# Patient Record
Sex: FEMALE | Race: OTHER | Employment: FULL TIME | URBAN - METROPOLITAN AREA
[De-identification: names, ages, dates, MRNs, and addresses within clinical notes are randomized per-mention and may not be internally consistent; named-entity substitution may affect disease eponyms.]

---

## 2018-10-29 LAB
ANTIBODY SCREEN, EXTERNAL: NEGATIVE
CHLAMYDIA, EXTERNAL: NEGATIVE
HBSAG, EXTERNAL: NEGATIVE
HEPATITIS C AB,   EXT: NEGATIVE
HIV, EXTERNAL: NEGATIVE
N. GONORRHEA, EXTERNAL: NEGATIVE
RPR, EXTERNAL: NEGATIVE
RUBELLA, EXTERNAL: NORMAL
TYPE, ABO & RH, EXTERNAL: NORMAL

## 2019-03-29 LAB — GRBS, EXTERNAL: POSITIVE

## 2019-04-29 ENCOUNTER — ANESTHESIA EVENT (OUTPATIENT)
Dept: LABOR AND DELIVERY | Age: 29
End: 2019-04-29
Payer: OTHER GOVERNMENT

## 2019-04-29 ENCOUNTER — ANESTHESIA (OUTPATIENT)
Dept: LABOR AND DELIVERY | Age: 29
End: 2019-04-29
Payer: OTHER GOVERNMENT

## 2019-04-29 ENCOUNTER — HOSPITAL ENCOUNTER (INPATIENT)
Age: 29
LOS: 2 days | Discharge: HOME | End: 2019-05-01
Attending: OBSTETRICS & GYNECOLOGY | Admitting: OBSTETRICS & GYNECOLOGY
Payer: OTHER GOVERNMENT

## 2019-04-29 PROBLEM — O34.219 HISTORY OF CESAREAN DELIVERY AFFECTING PREGNANCY: Status: ACTIVE | Noted: 2019-04-29

## 2019-04-29 LAB
ABO + RH BLD: NORMAL
BASOPHILS # BLD: 0 K/UL (ref 0–0.1)
BASOPHILS NFR BLD: 0 % (ref 0–2)
BLOOD GROUP ANTIBODIES SERPL: NORMAL
DIFFERENTIAL METHOD BLD: ABNORMAL
EOSINOPHIL # BLD: 0 K/UL (ref 0–0.4)
EOSINOPHIL NFR BLD: 1 % (ref 0–5)
ERYTHROCYTE [DISTWIDTH] IN BLOOD BY AUTOMATED COUNT: 13.7 % (ref 11.6–14.5)
HCT VFR BLD AUTO: 33.1 % (ref 35–45)
HGB BLD-MCNC: 11.3 G/DL (ref 12–16)
LYMPHOCYTES # BLD: 1 K/UL (ref 0.9–3.6)
LYMPHOCYTES NFR BLD: 16 % (ref 21–52)
MCH RBC QN AUTO: 32.1 PG (ref 24–34)
MCHC RBC AUTO-ENTMCNC: 34.1 G/DL (ref 31–37)
MCV RBC AUTO: 94 FL (ref 74–97)
MONOCYTES # BLD: 0.4 K/UL (ref 0.05–1.2)
MONOCYTES NFR BLD: 6 % (ref 3–10)
NEUTS SEG # BLD: 4.8 K/UL (ref 1.8–8)
NEUTS SEG NFR BLD: 77 % (ref 40–73)
PLATELET # BLD AUTO: 170 K/UL (ref 135–420)
PMV BLD AUTO: 12 FL (ref 9.2–11.8)
RBC # BLD AUTO: 3.52 M/UL (ref 4.2–5.3)
SPECIMEN EXP DATE BLD: NORMAL
WBC # BLD AUTO: 6.2 K/UL (ref 4.6–13.2)

## 2019-04-29 PROCEDURE — 75410000003 HC RECOV DEL/VAG/CSECN EA 0.5 HR: Performed by: OBSTETRICS & GYNECOLOGY

## 2019-04-29 PROCEDURE — 74011250636 HC RX REV CODE- 250/636

## 2019-04-29 PROCEDURE — 74011250637 HC RX REV CODE- 250/637: Performed by: NURSE ANESTHETIST, CERTIFIED REGISTERED

## 2019-04-29 PROCEDURE — 65270000029 HC RM PRIVATE

## 2019-04-29 PROCEDURE — 76060000078 HC EPIDURAL ANESTHESIA: Performed by: OBSTETRICS & GYNECOLOGY

## 2019-04-29 PROCEDURE — 74011250636 HC RX REV CODE- 250/636: Performed by: NURSE ANESTHETIST, CERTIFIED REGISTERED

## 2019-04-29 PROCEDURE — 75410000003 HC RECOV DEL/VAG/CSECN EA 0.5 HR

## 2019-04-29 PROCEDURE — 77030002933 HC SUT MCRYL J&J -A: Performed by: OBSTETRICS & GYNECOLOGY

## 2019-04-29 PROCEDURE — 77030039266 HC ADH SKN EXOFIN S2SG -A: Performed by: OBSTETRICS & GYNECOLOGY

## 2019-04-29 PROCEDURE — 10907ZC DRAINAGE OF AMNIOTIC FLUID, THERAPEUTIC FROM PRODUCTS OF CONCEPTION, VIA NATURAL OR ARTIFICIAL OPENING: ICD-10-PCS | Performed by: OBSTETRICS & GYNECOLOGY

## 2019-04-29 PROCEDURE — 74011250636 HC RX REV CODE- 250/636: Performed by: OBSTETRICS & GYNECOLOGY

## 2019-04-29 PROCEDURE — 77030002974 HC SUT PLN J&J -A: Performed by: OBSTETRICS & GYNECOLOGY

## 2019-04-29 PROCEDURE — 77030007866 HC KT SPN ANES BBMI -B: Performed by: ANESTHESIOLOGY

## 2019-04-29 PROCEDURE — 76010000391 HC C SECN FIRST 1 HR: Performed by: OBSTETRICS & GYNECOLOGY

## 2019-04-29 PROCEDURE — 77030031139 HC SUT VCRL2 J&J -A: Performed by: OBSTETRICS & GYNECOLOGY

## 2019-04-29 PROCEDURE — 74011000250 HC RX REV CODE- 250

## 2019-04-29 PROCEDURE — 86900 BLOOD TYPING SEROLOGIC ABO: CPT

## 2019-04-29 PROCEDURE — 77030032490 HC SLV COMPR SCD KNE COVD -B: Performed by: OBSTETRICS & GYNECOLOGY

## 2019-04-29 PROCEDURE — 77030018842 HC SOL IRR SOD CL 9% BAXT -A: Performed by: OBSTETRICS & GYNECOLOGY

## 2019-04-29 PROCEDURE — 85025 COMPLETE CBC W/AUTO DIFF WBC: CPT

## 2019-04-29 PROCEDURE — 77010026065 HC OXYGEN MINIMUM MEDICAL AIR

## 2019-04-29 RX ORDER — HYDROMORPHONE HYDROCHLORIDE 1 MG/ML
1 INJECTION, SOLUTION INTRAMUSCULAR; INTRAVENOUS; SUBCUTANEOUS
Status: ACTIVE | OUTPATIENT
Start: 2019-04-29 | End: 2019-04-30

## 2019-04-29 RX ORDER — ACETAMINOPHEN 325 MG/1
650 TABLET ORAL
Status: DISCONTINUED | OUTPATIENT
Start: 2019-04-29 | End: 2019-05-01 | Stop reason: HOSPADM

## 2019-04-29 RX ORDER — SODIUM CHLORIDE 0.9 % (FLUSH) 0.9 %
5-40 SYRINGE (ML) INJECTION AS NEEDED
Status: DISCONTINUED | OUTPATIENT
Start: 2019-04-29 | End: 2019-05-01 | Stop reason: HOSPADM

## 2019-04-29 RX ORDER — SODIUM CHLORIDE 0.9 % (FLUSH) 0.9 %
5-40 SYRINGE (ML) INJECTION EVERY 8 HOURS
Status: DISCONTINUED | OUTPATIENT
Start: 2019-04-29 | End: 2019-05-01 | Stop reason: HOSPADM

## 2019-04-29 RX ORDER — OXYCODONE HYDROCHLORIDE 5 MG/1
5 TABLET ORAL
Status: DISCONTINUED | OUTPATIENT
Start: 2019-04-29 | End: 2019-04-29 | Stop reason: SDUPTHER

## 2019-04-29 RX ORDER — SIMETHICONE 80 MG
80 TABLET,CHEWABLE ORAL
Status: DISCONTINUED | OUTPATIENT
Start: 2019-04-29 | End: 2019-05-01 | Stop reason: HOSPADM

## 2019-04-29 RX ORDER — TRISODIUM CITRATE DIHYDRATE AND CITRIC ACID MONOHYDRATE 500; 334 MG/5ML; MG/5ML
30 SOLUTION ORAL ONCE
Status: COMPLETED | OUTPATIENT
Start: 2019-04-29 | End: 2019-04-29

## 2019-04-29 RX ORDER — EPHEDRINE SULFATE 50 MG/ML
INJECTION, SOLUTION INTRAVENOUS AS NEEDED
Status: DISCONTINUED | OUTPATIENT
Start: 2019-04-29 | End: 2019-04-29 | Stop reason: HOSPADM

## 2019-04-29 RX ORDER — OXYCODONE HYDROCHLORIDE 5 MG/1
10 TABLET ORAL
Status: DISCONTINUED | OUTPATIENT
Start: 2019-04-29 | End: 2019-04-29 | Stop reason: SDUPTHER

## 2019-04-29 RX ORDER — BUPIVACAINE HYDROCHLORIDE 7.5 MG/ML
INJECTION, SOLUTION INTRASPINAL AS NEEDED
Status: DISCONTINUED | OUTPATIENT
Start: 2019-04-29 | End: 2019-04-29 | Stop reason: HOSPADM

## 2019-04-29 RX ORDER — MORPHINE SULFATE 1 MG/ML
INJECTION, SOLUTION EPIDURAL; INTRATHECAL; INTRAVENOUS AS NEEDED
Status: DISCONTINUED | OUTPATIENT
Start: 2019-04-29 | End: 2019-04-29 | Stop reason: HOSPADM

## 2019-04-29 RX ORDER — DOCUSATE SODIUM 100 MG/1
100 CAPSULE, LIQUID FILLED ORAL 2 TIMES DAILY
Status: DISCONTINUED | OUTPATIENT
Start: 2019-04-29 | End: 2019-05-01 | Stop reason: HOSPADM

## 2019-04-29 RX ORDER — OXYCODONE AND ACETAMINOPHEN 5; 325 MG/1; MG/1
1-2 TABLET ORAL
Status: DISCONTINUED | OUTPATIENT
Start: 2019-04-29 | End: 2019-05-01 | Stop reason: HOSPADM

## 2019-04-29 RX ORDER — ONDANSETRON 2 MG/ML
INJECTION INTRAMUSCULAR; INTRAVENOUS AS NEEDED
Status: DISCONTINUED | OUTPATIENT
Start: 2019-04-29 | End: 2019-04-29 | Stop reason: HOSPADM

## 2019-04-29 RX ORDER — PROMETHAZINE HYDROCHLORIDE 25 MG/ML
25 INJECTION, SOLUTION INTRAMUSCULAR; INTRAVENOUS
Status: DISCONTINUED | OUTPATIENT
Start: 2019-04-29 | End: 2019-05-01 | Stop reason: HOSPADM

## 2019-04-29 RX ORDER — FACIAL-BODY WIPES
10 EACH TOPICAL
Status: DISCONTINUED | OUTPATIENT
Start: 2019-04-29 | End: 2019-05-01 | Stop reason: HOSPADM

## 2019-04-29 RX ORDER — HYDROCORTISONE 25 MG/G
CREAM TOPICAL AS NEEDED
Status: DISCONTINUED | OUTPATIENT
Start: 2019-04-29 | End: 2019-05-01 | Stop reason: HOSPADM

## 2019-04-29 RX ORDER — NALOXONE HYDROCHLORIDE 0.4 MG/ML
0.4 INJECTION, SOLUTION INTRAMUSCULAR; INTRAVENOUS; SUBCUTANEOUS
Status: ACTIVE | OUTPATIENT
Start: 2019-04-29 | End: 2019-04-30

## 2019-04-29 RX ORDER — ACETAMINOPHEN 325 MG/1
650 TABLET ORAL
Status: DISCONTINUED | OUTPATIENT
Start: 2019-04-29 | End: 2019-04-29 | Stop reason: SDUPTHER

## 2019-04-29 RX ORDER — CEFAZOLIN SODIUM 2 G/50ML
2 SOLUTION INTRAVENOUS ONCE
Status: COMPLETED | OUTPATIENT
Start: 2019-04-29 | End: 2019-04-29

## 2019-04-29 RX ORDER — ONDANSETRON 2 MG/ML
4 INJECTION INTRAMUSCULAR; INTRAVENOUS
Status: DISCONTINUED | OUTPATIENT
Start: 2019-04-29 | End: 2019-04-29 | Stop reason: SDUPTHER

## 2019-04-29 RX ORDER — DIPHENHYDRAMINE HYDROCHLORIDE 50 MG/ML
25 INJECTION, SOLUTION INTRAMUSCULAR; INTRAVENOUS
Status: DISCONTINUED | OUTPATIENT
Start: 2019-04-29 | End: 2019-05-01 | Stop reason: HOSPADM

## 2019-04-29 RX ORDER — SODIUM CHLORIDE, SODIUM LACTATE, POTASSIUM CHLORIDE, CALCIUM CHLORIDE 600; 310; 30; 20 MG/100ML; MG/100ML; MG/100ML; MG/100ML
125 INJECTION, SOLUTION INTRAVENOUS CONTINUOUS
Status: DISPENSED | OUTPATIENT
Start: 2019-04-29 | End: 2019-04-30

## 2019-04-29 RX ORDER — IBUPROFEN 400 MG/1
800 TABLET ORAL
Status: DISCONTINUED | OUTPATIENT
Start: 2019-05-02 | End: 2019-04-30

## 2019-04-29 RX ORDER — ONDANSETRON 2 MG/ML
4 INJECTION INTRAMUSCULAR; INTRAVENOUS
Status: DISCONTINUED | OUTPATIENT
Start: 2019-04-29 | End: 2019-05-01 | Stop reason: HOSPADM

## 2019-04-29 RX ORDER — KETOROLAC TROMETHAMINE 30 MG/ML
30 INJECTION, SOLUTION INTRAMUSCULAR; INTRAVENOUS EVERY 6 HOURS
Status: DISCONTINUED | OUTPATIENT
Start: 2019-04-29 | End: 2019-04-30 | Stop reason: ALTCHOICE

## 2019-04-29 RX ORDER — OXYTOCIN/RINGER'S LACTATE 20/1000 ML
125 PLASTIC BAG, INJECTION (ML) INTRAVENOUS CONTINUOUS
Status: DISCONTINUED | OUTPATIENT
Start: 2019-04-29 | End: 2019-05-01 | Stop reason: HOSPADM

## 2019-04-29 RX ORDER — OXYTOCIN/RINGER'S LACTATE 20/1000 ML
PLASTIC BAG, INJECTION (ML) INTRAVENOUS AS NEEDED
Status: DISCONTINUED | OUTPATIENT
Start: 2019-04-29 | End: 2019-04-29

## 2019-04-29 RX ORDER — KETOROLAC TROMETHAMINE 30 MG/ML
INJECTION, SOLUTION INTRAMUSCULAR; INTRAVENOUS AS NEEDED
Status: DISCONTINUED | OUTPATIENT
Start: 2019-04-29 | End: 2019-04-29 | Stop reason: HOSPADM

## 2019-04-29 RX ORDER — KETOROLAC TROMETHAMINE 30 MG/ML
30 INJECTION, SOLUTION INTRAMUSCULAR; INTRAVENOUS EVERY 6 HOURS
Status: DISCONTINUED | OUTPATIENT
Start: 2019-04-29 | End: 2019-04-29 | Stop reason: SDUPTHER

## 2019-04-29 RX ORDER — SODIUM CHLORIDE, SODIUM LACTATE, POTASSIUM CHLORIDE, CALCIUM CHLORIDE 600; 310; 30; 20 MG/100ML; MG/100ML; MG/100ML; MG/100ML
125 INJECTION, SOLUTION INTRAVENOUS CONTINUOUS
Status: DISCONTINUED | OUTPATIENT
Start: 2019-04-29 | End: 2019-05-01

## 2019-04-29 RX ADMIN — SODIUM CHLORIDE, SODIUM LACTATE, POTASSIUM CHLORIDE, AND CALCIUM CHLORIDE: 600; 310; 30; 20 INJECTION, SOLUTION INTRAVENOUS at 10:48

## 2019-04-29 RX ADMIN — SODIUM CHLORIDE, SODIUM LACTATE, POTASSIUM CHLORIDE, AND CALCIUM CHLORIDE 125 ML/HR: 600; 310; 30; 20 INJECTION, SOLUTION INTRAVENOUS at 09:16

## 2019-04-29 RX ADMIN — KETOROLAC TROMETHAMINE 30 MG: 30 INJECTION, SOLUTION INTRAMUSCULAR at 18:13

## 2019-04-29 RX ADMIN — SODIUM CHLORIDE, SODIUM LACTATE, POTASSIUM CHLORIDE, AND CALCIUM CHLORIDE 1000 ML: 600; 310; 30; 20 INJECTION, SOLUTION INTRAVENOUS at 08:45

## 2019-04-29 RX ADMIN — BUPIVACAINE HYDROCHLORIDE 1.4 ML: 7.5 INJECTION, SOLUTION INTRASPINAL at 10:33

## 2019-04-29 RX ADMIN — EPHEDRINE SULFATE 20 MG: 50 INJECTION, SOLUTION INTRAVENOUS at 10:37

## 2019-04-29 RX ADMIN — KETOROLAC TROMETHAMINE 30 MG: 30 INJECTION, SOLUTION INTRAMUSCULAR; INTRAVENOUS at 10:53

## 2019-04-29 RX ADMIN — SODIUM CITRATE AND CITRIC ACID MONOHYDRATE 30 ML: 500; 334 SOLUTION ORAL at 10:19

## 2019-04-29 RX ADMIN — Medication 125 ML/HR: at 11:34

## 2019-04-29 RX ADMIN — PROMETHAZINE HYDROCHLORIDE 25 MG: 25 INJECTION INTRAMUSCULAR; INTRAVENOUS at 11:46

## 2019-04-29 RX ADMIN — MORPHINE SULFATE 4.75 MG: 1 INJECTION, SOLUTION EPIDURAL; INTRATHECAL; INTRAVENOUS at 11:03

## 2019-04-29 RX ADMIN — MORPHINE SULFATE 5 MG: 1 INJECTION, SOLUTION EPIDURAL; INTRATHECAL; INTRAVENOUS at 11:00

## 2019-04-29 RX ADMIN — CEFAZOLIN SODIUM 2 G: 2 SOLUTION INTRAVENOUS at 10:25

## 2019-04-29 RX ADMIN — MORPHINE SULFATE 0.25 MG: 1 INJECTION, SOLUTION EPIDURAL; INTRATHECAL; INTRAVENOUS at 10:33

## 2019-04-29 RX ADMIN — SODIUM CHLORIDE, SODIUM LACTATE, POTASSIUM CHLORIDE, AND CALCIUM CHLORIDE 125 ML/HR: 600; 310; 30; 20 INJECTION, SOLUTION INTRAVENOUS at 19:11

## 2019-04-29 RX ADMIN — ONDANSETRON 8 MG: 2 INJECTION INTRAMUSCULAR; INTRAVENOUS at 10:25

## 2019-04-29 NOTE — L&D DELIVERY NOTE
Delivery Summary    Patient: Ginette San MRN: [id number]  SSN: [id number]    YOB: 1990  Age: 30 y.o. Sex: female        Information for the patient's :  Eva Charles [[id number]]       Labor Events:    Labor: No    Steroids: None   Cervical Ripening Date/Time:       Cervical Ripening Type: None   Antibiotics During Labor: No   Rupture Identifier: Sac 1    Rupture Date/Time: 2019 10:47 AM   Rupture Type: AROM   Amniotic Fluid Volume: Moderate    Amniotic Fluid Description: Clear    Amniotic Fluid Odor: None    Induction: None       Induction Date/Time:        Indications for Induction:      Augmentation: None   Augmentation Date/Time:      Indications for Augmentation:     Labor complications: None       Additional complications:        Delivery Events:  Indications For Episiotomy:     Episiotomy: None   Perineal Laceration(s): None   Repaired:     Periurethral Laceration Location:      Repaired:     Labial Laceration Location:     Repaired:     Sulcal Laceration Location:     Repaired:     Vaginal Laceration Location:     Repaired:     Cervical Laceration Location:     Repaired:     Repair Suture:     Number of Repair Packets:     Estimated Blood Loss (ml):  ml     Delivery Date: 2019    Delivery Time: 10:48 AM   Delivery Type: , Low Transverse     Details    Trial of Labor: No   Primary/Repeat: Primary   Priority: Routine   Indications:   Other (Add Comments) patient request     Sex:  Female     Gestational Age: 44w3d  Delivery Clinician:  Ceci Martinez  Living Status: Living   Delivery Location: OR            APGARS  One minute Five minutes Ten minutes   Skin color: 1   1        Heart rate: 2   2        Grimace: 2           Muscle tone: 2   2        Breathin   2        Totals: 9             Presentation: Vertex    Position:        Resuscitation Method:  Tactile Stimulation     Meconium Stained: None      Cord Information: 3 Vessels  Complications: None  Cord around:    Delayed cord clamping? No  Cord clamped date/time:2019 10:48 AM  Disposition of Cord Blood: Lab    Blood Gases Sent?: No    Placenta:  Date/Time: 2019 10:49 AM  Removal: Expressed      Appearance: Normal     [place] Measurements:  Birth Weight:        Birth Length:        Head Circumference:        Chest Circumference:       Abdominal Girth: Other Providers:   KLAUS Stokes Nelida Alstrom, [facility], DAVID Verlin Mages, Obstetrician;Primary Nurse;Primary  Nurse;Pediatrician; Anesthesiologist;Crna             Group B Strep:   Lab Results   Component Value Date/Time    GrBStrep, External Positive 2019     Information for the patient's :  Sabrina Charles [[id number]]   No results found for: ABORH, PCTABR, PCTDIG, BILI, ABORHEXT, ABORH

## 2019-04-29 NOTE — PROGRESS NOTES
arrived ambulatory for scheduled  scetion. She is accompanied by her significant other Tomasa Bautista. Pt to bathroom changed to a gown, monitors applied. 2 unsuccessful Iv attempts per [facility] RN, successful IV on 3rd attempt in L upper forearm. Consents signed Hair clipped and CHG wipes done per [facility] RN.

## 2019-04-29 NOTE — ANESTHESIA PREPROCEDURE EVALUATION
Relevant Problems No relevant active problems Anesthetic History No history of anesthetic complications Review of Systems / Medical History Patient summary reviewed and pertinent labs reviewed Pulmonary Within defined limits Neuro/Psych Within defined limits Cardiovascular Within defined limits Exercise tolerance: >4 METS 
  
GI/Hepatic/Renal 
Within defined limits Endo/Other Within defined limits Other Findings Physical Exam 
 
Airway Mallampati: II 
TM Distance: 4 - 6 cm Neck ROM: normal range of motion Mouth opening: Normal 
 
 Cardiovascular Regular rate and rhythm,  S1 and S2 normal,  no murmur, click, rub, or gallop Rhythm: regular Rate: normal 
 
 
 
 Dental 
 
Dentition: Lower dentition intact and Upper dentition intact Pulmonary Breath sounds clear to auscultation Abdominal 
GI exam deferred Other Findings Anesthetic Plan ASA: 2 Anesthesia type: spinal and general - backup Induction: Intravenous Anesthetic plan and risks discussed with: Patient

## 2019-04-29 NOTE — OP NOTES
Section Operative Note      Name: Logan De Jesus   Medical Record Number: [id number]   Today's Date: 2019      PREOP DIAGNOSIS:   1.  25yo  at 40.3wks  2. Elective CS per patient request  POSTOP DIAGNOSIS: Same    PROCEDURE: Primary low transverse  section    Isabel Montana MD    ASSISTANT: Rex Hernandez SA  ANESTHESIA: Spinal    EBL: 300mL  IVF:  1400mL LR  UOP:  150mL clear yellow urine    ANTIBIOTICS: Ancef   COMPLICATIONS: none  CONDITION: stable to recovery    FETAL DESCRIPTION: wilson female    BIRTH INFORMATION:   Information for the patient's :  Anthony Form [[id number]]          OPERATIVE FINDINGS:      At the time of the surgery a live Female delivered  with an APGAR of  9 and 9 at 1 and 5 minutes respectively. Birth weight is pending  Amniotic fluid was clear with moderate amount. Cord had 3 vessels, placenta intact. Inspection of the uterus, fallopian tubes and ovaries revealed normal anatomy. PROCEDURE:    Informed consent was obtained and risks discussed including risk of damage to bowel, bladder, nerves and blood vessels. Consent was obtained for blood transfusion in the case of emergency. The patient was taken to the operating room, where spinal anesthesia was found to be adequate. The patient was prepped and draped in the normal sterile fashion in the supine position. A Pfannenstiel skin incision was made with the scalpel and carried down to the underlying layer of fascia which was incised in the midline. The fascial incision was extended laterally with the curved Heredia scissors. The superior aspect of the fascial incision was grasped with Kocher clamps, elevated, and the underlying rectus muscles were dissected off bluntly and with Heredia scissors. The inferior aspect of the fascia was grasped with the Kocher clamps, elevated, and the underlying rectus muscles were dissected off in a similar fashion.  The rectus muscles were  in the midline, and the peritoneum was entered bluntly, then spread by pulling superiorly and laterally. The bladder blade was inserted. The vesicouterine peritoneum was tented, entered sharply with the Metzebaum scissors, then extended laterally. The bladder flap was created digitally, and the bladder blade was reinserted. A low transverse uterine incision was made with the scalpel and extended by pulling cephalad and caudad. There was moderate amount of clear amniotic fluid. The babys head was delivered atraumatically followed by the body without difficulty. The crying infant's nose and mouth were suctioned with bulb suction. The cord was clamped and cut and the crying infant was handed off to the waiting pediatricians. The placenta was expressed with uterine massage and gentle traction. The uterus was exteriorized and cleared of all clots and debris. The uterine incision was closed in two layers. The first layer with running locked layer of 0 Vicryl. The second layer was an imbricating layer of 0 Monocryl with excellent hemostasis. The posterior cul-de-sac was cleared of all clots and debris. The uterus was returned to the pelvis. The paracolic gutters were cleared of all clots and debris. The uterine incision was reinspected and found to be hemostatic. The fascia was closed with 0 Vicryl in a running fashion. The subcuticular layers were irrigated, then suctioned. The bovie was used for hemostasis. The subcutaneous tissue was then reapproximated with 2-0 plain gut in interrupted fashion. The skin was closed with a 4-0 monocryl in a subcuticular fashion. Dermabond was placed on the incision. The patient tolerated the procedure well. Sponge, lap, and needle counts were correct times two and the patient was taken to recovery in stable condition.           Signed By:  Reji Rawls MD     April 29, 2019

## 2019-04-29 NOTE — PROGRESS NOTES
1120- pt to RR with Alli ROSE and RN. Monitors attached. Gaston in place draining clear yellow urine, IV infusing LR with 20 U pitocin with 100 ml CLAYTON. FOB feeding baby a bottle in the RR 
 
1135- pt hold ing baby, wrapped in swaddle, offered skin to skin, pt declined at this time 1142- pt with emesis x2. 
 
1146- phenergan given IM in L Leg 
 
1230- pt reports mild discomfort with fundal checks but is denying pain at present. 1337- Monitors removed, pericare done. Gown and pads changed. Pt transferred to [facility] via stretcher and transferred to bed with little assistance. FOB and baby in room with patient. 1405- report to C.  [employer] RN

## 2019-04-29 NOTE — H&P
History & Physical 
 
Name: Marc Solis MRN: [id number]  SSN: [id number] YOB: 1990  Age: 30 y.o. Sex: female Subjective:  
 
Estimated Date of Delivery: None noted. OB History Lynda Colla 1 Para Term  AB Living SAB  
   
 TAB Ectopic Molar Multiple Live Births Ms. Gregoria Paz is a 27y/o  at 40.3wks with ADRY 19 by LMP c/w 14.3wk US. She has followed with [facility] for prenatal care since 14.3wks. She presents for scheduled elective PCS. Pt is requesting elective PCS even if she goes into labor on her own. She has discussed this with her family who has had CS, her mother's OBGYN who pt reports recommended she have a CS. She is certain she desires CS. She denies any complaints including ctxs, VB, LOF.  +FM. This pregnancy, pt had h/o low lying placenta that has since resolved. OB HISTORY 
G1 - current GYN HISTORY 
10/29/18 - LSIL s/p colposcopy without biopsies at 16wks. Plan to repeat pap postpartum. Denies h/o abnormal paps or STIs. PAST MEDICAL HISTORY Denies PAST SURGICAL HISTORY 
 - Photorefractive keratoplasty FAMILY HISTORY Colon cancer - [facility] 
 
SOCIAL HISTORY Quit smoking , smoked 1ppw. Denies EtOH, illicit drug use. ,  in [place]. ALLERGIES 
NKDA MEDICATIONS 1. Prenatal vitamin 1 tablet po daily Review of Systems: Denies fevers, chills, chest pain, shortness of breath, nausea, vomiting, diarrhea, dizziness, low extremity swelling or pain. Objective:  
 
Vitals: 
/73, BMI 34.8, Wt 209lbs, Ht 5ft 5 in Physical Exam: 
Gen:  A&O, NAD 
CV:  RRR Pulm: CTAB Abd:  Soft, gravid, nttp Cervix:  4/50/-3 at office visit 19 Ext:  No clubbing/cyanosis/redness, trace edema, nttp Prenatal Labs:  
Blood type O pos, ABD neg Rubella immune HIV/RPR/HepBsAg NR 
GC/CT neg Hep C neg TSH wnl 
 AFP wnl 1hr  GBS pos Impression/Plan:  
 
25yo  at 40.3wks for elective PCS. Pt requested at 39.6wks. Extensive counseling regarding risks of CS vs. , including conerns for future pregnancies, and pt refuses  and requesting elective PCS. Risks and benefits of CS discussed with pt with risks of infection, bleeding, possible need for blood transfusion (HIV 1/1,000,000, Hep C 1/250,000), injury to other organs/baby, anesthesia complications, need for other indicated procedures, post operative pain, post operative wound infection with extended recovery and possible repeat surgery, risks associated with TOLAC vs. RCS with possible accreta in future pregnancies. Pt voices an understanding and consents to proceed with PCS. Signed By:  Brenda Bravo MD   
 2019

## 2019-04-29 NOTE — INTERVAL H&P NOTE
H&P Update: 
Osman Razo was seen and examined. History and physical has been reviewed. The patient has been examined. There have been no significant clinical changes since the completion of the originally dated History and Physical.  Pt declines SVE, denies ctxs, VB, LOF.  +FM. She is certain she desires PCS despite review of risks/benefits as detailed in H&P including risks with future deliveries/pregnancies. Hgb 11.3. TOCO:  Irregular ctxs FHTs:  130s, mod variability, +accels, 1 decel when placed on monitor Cephalic

## 2019-04-29 NOTE — ANESTHESIA PROCEDURE NOTES
Spinal Block    Start time: 4/29/2019 10:26 AM  End time: 4/29/2019 10:33 AM  Performed by: Dania Key CRNA  Authorized by: Gregoria Gilbert MD     Pre-procedure:   Indications: primary anesthetic  Preanesthetic Checklist: patient identified, risks and benefits discussed, anesthesia consent, site marked, patient being monitored and timeout performed    Timeout Time: 10:26          Spinal Block:   Patient Position:  Seated  Prep Region:  Lumbar  Prep: chlorhexidine and patient draped      Location:  L3-4  Technique:  Single shot        Needle:   Needle Type:  Pencan  Needle Gauge:  24 G  Attempts:  1      Events: CSF confirmed, no blood with aspiration and no paresthesia        Assessment:  Insertion:  Uncomplicated  Patient tolerance:  Patient tolerated the procedure well with no immediate complications  [employer] model #[id number]  Lot#  [id number]

## 2019-04-29 NOTE — PROGRESS NOTES
1900: Bedside and Verbal shift change report given to Mary Ann Gabrielkenisha 149 (oncoming nurse) by Jerry Ruggiero RN (offgoing nurse). Report included the following information SBAR, OR Summary, Procedure Summary, Intake/Output, MAR and Recent Results. 1910: This RN to room, pt resting comfortably,family at side. Will assess fully once visitors leave. Discussed POC with pt, verbalizes understanding. Denies needs or concerns. 1945: This RN to room, pt resting comfortably. VS and assessment WNL. Denies needs or concerns. Request to defer pericare until visitors leave. Pt to let RN know when visitors have left. 2035: This RN to room, pericare performed, pad and chux changed, pt denies needs or concerns. 2100: Pt resting comfortably, denies needs or concerns, will inform this RN when FOB returns so infant can room in. 
 
2200: This RN to room, pt resting comfortably. Denies needs or concerns. 0000: This RN to room, pt resting comfortably. Denies needs or concerns. 0200: This Rn to room, pt resting comfortably. Denies needs or concerns. 0345: This RN to room, reassessment and VS WNL. Pt denies needs or concerns. Will allow to rest until 0600 before removing reynoso and ambulating. 1418: This RN to room, assisted pt up to restroom, reynoso catheter removed, pericare performed, pt ambulated well. Pt ambulating independently about room, encouraged to rest as pain will be more intense this afternoon. Pt verbalizes understanding. Reporting pain on R side of incision, but declines need for ice pack. Discussed PO pain management once eating reg diet, pt verbalizes understanding.

## 2019-04-29 NOTE — ROUTINE PROCESS
TRANSFER - IN REPORT: 
 
Verbal report received from 89 Vonda Wooten RN (name) on Osman Razo  being received from L&D (unit) for routine progression of care Report consisted of patients Situation, Background, Assessment and  
Recommendations(SBAR). Information from the following report(s) SBAR, OR Summary, Procedure Summary, Intake/Output, MAR, Accordion and Recent Results was reviewed with the receiving nurse. Opportunity for questions and clarification was provided. Assessment completed upon patients arrival to unit and care assumed. 1415 - rounded on pt, introduced self to pt, vitals done. Pt rates pain 1/10 and denies questions/concerns/needs at this time. Pts boyfriend left to feed the dogs and pt was alone in room with baby. Education done by this RN as to why pt must have someone else in the room with her after csection. Baby taken to nursery; pt agreeable and aware to let us know when he returns so we can bring baby back to room. Pt c/o dry mouth, states she is thirsty. RN gave her PO challenge with ice.  
 
1500 - Pt tolerated ice well and is requesting water. 12 - RN provided water. 1530 - Pt tolerated PO water well. 1800 - RN in room for assessment and vitals. RN offered pt chicken broth, beef broth, or canned tomato soup for dinner. RN provided warm tomato soup, 2 crackers, fresh water, and popscicles.

## 2019-04-30 LAB
HCT VFR BLD AUTO: 36.7 % (ref 35–45)
HGB BLD-MCNC: 12 G/DL (ref 12–16)

## 2019-04-30 PROCEDURE — 36415 COLL VENOUS BLD VENIPUNCTURE: CPT

## 2019-04-30 PROCEDURE — 85018 HEMOGLOBIN: CPT

## 2019-04-30 PROCEDURE — 85014 HEMATOCRIT: CPT

## 2019-04-30 PROCEDURE — 74011250636 HC RX REV CODE- 250/636: Performed by: OBSTETRICS & GYNECOLOGY

## 2019-04-30 PROCEDURE — 65270000029 HC RM PRIVATE

## 2019-04-30 PROCEDURE — 74011250637 HC RX REV CODE- 250/637: Performed by: OBSTETRICS & GYNECOLOGY

## 2019-04-30 RX ORDER — IBUPROFEN 400 MG/1
800 TABLET ORAL
Status: DISCONTINUED | OUTPATIENT
Start: 2019-04-30 | End: 2019-05-01 | Stop reason: HOSPADM

## 2019-04-30 RX ADMIN — OXYCODONE HYDROCHLORIDE AND ACETAMINOPHEN 2 TABLET: 5; 325 TABLET ORAL at 22:42

## 2019-04-30 RX ADMIN — IBUPROFEN 800 MG: 400 TABLET ORAL at 17:47

## 2019-04-30 RX ADMIN — DOCUSATE SODIUM 100 MG: 100 CAPSULE, LIQUID FILLED ORAL at 17:47

## 2019-04-30 RX ADMIN — OXYCODONE HYDROCHLORIDE AND ACETAMINOPHEN 2 TABLET: 5; 325 TABLET ORAL at 09:06

## 2019-04-30 RX ADMIN — SODIUM CHLORIDE, SODIUM LACTATE, POTASSIUM CHLORIDE, AND CALCIUM CHLORIDE 125 ML/HR: 600; 310; 30; 20 INJECTION, SOLUTION INTRAVENOUS at 03:50

## 2019-04-30 RX ADMIN — OXYCODONE HYDROCHLORIDE AND ACETAMINOPHEN 2 TABLET: 5; 325 TABLET ORAL at 13:12

## 2019-04-30 RX ADMIN — IBUPROFEN 800 MG: 400 TABLET ORAL at 09:06

## 2019-04-30 RX ADMIN — OXYCODONE HYDROCHLORIDE AND ACETAMINOPHEN 2 TABLET: 5; 325 TABLET ORAL at 17:47

## 2019-04-30 RX ADMIN — KETOROLAC TROMETHAMINE 30 MG: 30 INJECTION, SOLUTION INTRAMUSCULAR at 02:03

## 2019-04-30 RX ADMIN — DOCUSATE SODIUM 100 MG: 100 CAPSULE, LIQUID FILLED ORAL at 09:06

## 2019-04-30 NOTE — PROGRESS NOTES
Post-Operative  Progress Note Patient doing well post-op day 1 from  delivery without significant complaints. Pain controlled on current medication. Reynoso draining clear yellow urine,  normal lochia. Bottlefeeding. Tolerating diet. Vitals:  
Patient Vitals for the past 8 hrs: 
 Temp Pulse Resp BP SpO2  
19 0345 98.1 °F (36.7 °C) (!) 46 16 101/65 98 % 19 0000 98.1 °F (36.7 °C) (!) 46 16 98/59 98 % Exam:  Patient without distress. Abdomen soft, fundus firm at level of umbilicus, non tender. Incision dry and clean without erythema. Lower extremities are negative for swelling, cords or tenderness. Lab/Data Review: 
Lab Results Component Value Date/Time WBC 6.2 2019 08:49 AM  
 HGB 11.3 (L) 2019 08:49 AM  
 HCT 33.1 (L) 2019 08:49 AM  
 PLATELET 268 50/10/4181 08:49 AM  
 MCV 94.0 2019 08:49 AM  
 
 
Assessment: POD # 1 s/p  section for elective Plan:  
Routine postop care. Advance diet. Encourage ambulation. Will d/c reynoso this am/ change to PO meds. Pratibha Mcclure CNM 2019

## 2019-04-30 NOTE — PROGRESS NOTES
Bedside and Verbal shift change report given to [address] by Kathy Scherer RN. Report included the following information SBAR, Kardex, Procedure Summary, Intake/Output, MAR and Recent Results.

## 2019-05-01 VITALS
HEART RATE: 61 BPM | BODY MASS INDEX: 33.66 KG/M2 | TEMPERATURE: 98.7 F | OXYGEN SATURATION: 100 % | DIASTOLIC BLOOD PRESSURE: 88 MMHG | SYSTOLIC BLOOD PRESSURE: 136 MMHG | WEIGHT: 202 LBS | RESPIRATION RATE: 16 BRPM | HEIGHT: 65 IN

## 2019-05-01 PROCEDURE — 74011250637 HC RX REV CODE- 250/637: Performed by: OBSTETRICS & GYNECOLOGY

## 2019-05-01 RX ORDER — OXYCODONE AND ACETAMINOPHEN 5; 325 MG/1; MG/1
1 TABLET ORAL
Qty: 24 TAB | Refills: 0 | Status: SHIPPED | OUTPATIENT
Start: 2019-05-01 | End: 2019-05-04

## 2019-05-01 RX ORDER — IBUPROFEN 800 MG/1
800 TABLET ORAL
Qty: 60 TAB | Refills: 1 | Status: SHIPPED | OUTPATIENT
Start: 2019-05-01

## 2019-05-01 RX ORDER — DOCUSATE SODIUM 100 MG/1
100 CAPSULE, LIQUID FILLED ORAL 2 TIMES DAILY
Qty: 60 CAP | Refills: 2 | Status: SHIPPED | OUTPATIENT
Start: 2019-05-01 | End: 2019-07-30

## 2019-05-01 RX ADMIN — DOCUSATE SODIUM 100 MG: 100 CAPSULE, LIQUID FILLED ORAL at 09:29

## 2019-05-01 RX ADMIN — OXYCODONE HYDROCHLORIDE AND ACETAMINOPHEN 1 TABLET: 5; 325 TABLET ORAL at 14:14

## 2019-05-01 RX ADMIN — IBUPROFEN 800 MG: 400 TABLET ORAL at 01:47

## 2019-05-01 NOTE — DISCHARGE SUMMARY
Obstetrical Discharge Summary     Name: Minh Thompson MRN: [id number]  SSN: [id number]    YOB: 1990  Age: 30 y.o. Sex: female      Admit Date: 2019    Discharge Date: 2019     Admitting Physician: Emerita Mccann MD     Attending Physician:  Darrell Hills MD     Admission Diagnoses: History of  delivery affecting pregnancy [O34.219]    Discharge Diagnoses:   Information for the patient's :  Linus Espinoza [[id number]]   Delivery of a 3.265 kg female infant via , Low Transverse on 2019 at 10:48 AM  by . Apgars were 9 and 9. Patient Active Problem List   Diagnosis Code    History of  delivery affecting pregnancy O34.219    Postpartum care following  delivery Z39.2       Immunization(s):   Immunization History   Administered Date(s) Administered    Tdap 2019        Labs:   Results for Miladys Bautista (MRN [id number]) as of 2019 09:01   Ref. Range 2019 05:55   HGB Latest Ref Range: 12.0 - 16.0 g/dL 12.0   HCT Latest Ref Range: 35.0 - 45.0 % 36.7     Exam:  Breathing without exertion. Breasts are soft and non-tender  Fundus firm and nontender  Incision healing well, no induration or erythema  Lochia light  Bowel sounds are normal  Legs are normal without tenderness, swelling, or redness    Hospital Course: Normal hospital course following the delivery. Patient Instructions:   Current Discharge Medication List      START taking these medications    Details   docusate sodium (COLACE) 100 mg capsule Take 1 Cap by mouth two (2) times a day for 90 days. Qty: 60 Cap, Refills: 2      ibuprofen (MOTRIN) 800 mg tablet Take 1 Tab by mouth every eight (8) hours as needed for Pain. Qty: 60 Tab, Refills: 1      oxyCODONE-acetaminophen (PERCOCET) 5-325 mg per tablet Take 1 Tab by mouth every four (4) hours as needed for Pain for up to 3 days. Max Daily Amount: 6 Tabs.   Qty: 24 Tab, Refills: 0    Associated Diagnoses: Postpartum care following  delivery         CONTINUE these medications which have NOT CHANGED    Details   [id number] nat-folic-dha (PRENATAL DHA+COMPLETE PRENATAL) -300 mg-mcg-mg cmpk Take 1 Tab by mouth daily. Indications: pregnancy             Assessment/Plan:Patient condition is stable for discharge home. She has been having an uncomplicated postpartum course s/p . Pain is well controlled on current medications. Breastfeeding well. Infant stable. Discharge home today. Precautions given. RTO for postpartum follow up in 6 weeks.       Signed By:  Clarisa French CNM     May 1, 2019

## 2019-05-01 NOTE — PROGRESS NOTES
conducted an Initial consultation and Spiritual Assessment for Flynn Rascon, who is a 30 y.o.,female. Patients Primary Language is: [place]. According to the patients EMR Rastafari Affiliation is: Orthodoxy. The reason the Patient came to the hospital is:  
Patient Active Problem List  
 Diagnosis Date Noted  Postpartum care following  delivery 2019  
 History of  delivery affecting pregnancy 2019 The  provided the following Interventions: 
Initiated a relationship of care and support. Patient cradling her baby Jossie Bruce, a girl. Both of them may get discharged this afternoon. Explored issues of reema and belief. Patient confirmed her Nondenominational affiliation as Ned 5. Listened empathically to patient. She talked about her significant other, Evelyne Mario, who is in the [employer]. He may have new orders when he returns for shore duty, and they all may have to leave by year's end. Offered prayer and assurance of continued prayer for family. Chart reviewed. The following outcomes were achieved: 
Patient shared limited information about her medical narrative and spiritual journey. Patient expressed gratitude for the 's visit. Assessment: 
Patient does not have any Nondenominational or cultural needs that will affect patients preferences in health care. Patient did not indicate any other spiritual or Nondenominational issues which require Spiritual Care Services interventions at this time. Plan: 
Chaplains will continue to follow and will provide pastoral care as needed or requested.  recommends bedside caregivers page  on duty if patient shows signs of acute spiritual or emotional distress. The Rev. Aashish [address] Spiritual Care Services [employer] [facility] [phone] / [facility] [phone]

## 2019-05-01 NOTE — DISCHARGE INSTRUCTIONS

## 2019-05-01 NOTE — ROUTINE PROCESS
Bedside and Verbal shift change report given to Yanet Goldstein RN (oncoming nurse) by Emelina Mccurdy RN (offgoing nurse). Report included the following information SBAR, Kardex, MAR and Recent Results. [id number] [facility] Discharge orders received; awaiting orders for baby. [address] Instructions reviewed; all questions answered and concerns addressed. [facility] Pt discharged via wheelchair.

## 2019-05-15 NOTE — ANESTHESIA POSTPROCEDURE EVALUATION
Procedure(s):  SECTION primary. spinal, general - backup Anesthesia Post Evaluation Multimodal analgesia: multimodal analgesia used between 6 hours prior to anesthesia start to PACU discharge Patient participation: complete - patient participated Level of consciousness: awake Pain score: 0 Pain management: adequate Airway patency: patent Anesthetic complications: no 
Cardiovascular status: stable Respiratory status: acceptable Hydration status: acceptable Comments: Late entry; notes reviewed. No issues notred Post anesthesia nausea and vomiting:  controlled and none Vitals Value Taken Time /72 2019  1:30 PM  
Temp Pulse 51 2019  1:37 PM  
Resp 14 2019  1:37 PM  
SpO2 99 % 2019  1:37 PM

## (undated) DEVICE — INTENDED FOR TISSUE SEPARATION, AND OTHER PROCEDURES THAT REQUIRE A SHARP SURGICAL BLADE TO PUNCTURE OR CUT.: Brand: BARD-PARKER SAFETY BLADES SIZE 10, STERILE

## (undated) DEVICE — SUT MONOCRYL PLUS UD 4-0 --

## (undated) DEVICE — FABRIC REINFORCED, SURGICAL GOWN, XL: Brand: CONVERTORS

## (undated) DEVICE — PACK PROCEDURE SURG C SECT DMC

## (undated) DEVICE — SUTURE PLN GUT SZ 2-0 L27IN ABSRB YELLOWISH TAN L70MM XLH 53T

## (undated) DEVICE — GAMMEX® NON-LATEX SIZE 7.5, STERILE NEOPRENE POWDER-FREE SURGICAL GLOVE: Brand: GAMMEX

## (undated) DEVICE — STERILE POLYISOPRENE POWDER-FREE SURGICAL GLOVES: Brand: PROTEXIS

## (undated) DEVICE — SPONGE LAP 18X18IN STRL -- 5/PK

## (undated) DEVICE — KENDALL SCD EXPRESS SLEEVES, KNEE LENGTH, MEDIUM: Brand: KENDALL SCD

## (undated) DEVICE — CATH KT SUC CTRL VLV 10FR --

## (undated) DEVICE — TOWEL SURG W16XL26IN BLU NONFENESTRATED DLX ST 2 PER PK

## (undated) DEVICE — MEDI-VAC NON-CONDUCTIVE SUCTION TUBING: Brand: CARDINAL HEALTH

## (undated) DEVICE — APPLICATOR BNDG 1MM ADH PREMIERPRO EXOFIN

## (undated) DEVICE — SUTURE VCRL SZ 0 L36IN ABSRB VLT L40MM CT 1/2 CIR J358H

## (undated) DEVICE — SUTURE MCRYL SZ 0 L36IN ABSRB UD L36MM CT-1 1/2 CIR Y946H

## (undated) DEVICE — SOL IRR SOD CL 0.9% 1000ML BTL --

## (undated) DEVICE — FLEX ADVANTAGE 1500CC: Brand: FLEX ADVANTAGE

## (undated) DEVICE — GOWN,AURORA,FABRIC-REINFORCED,X-LARGE: Brand: MEDLINE

## (undated) DEVICE — SHEET,DRAPE,40X58,STERILE: Brand: MEDLINE